# Patient Record
Sex: FEMALE | Race: WHITE | Employment: OTHER | URBAN - NONMETROPOLITAN AREA
[De-identification: names, ages, dates, MRNs, and addresses within clinical notes are randomized per-mention and may not be internally consistent; named-entity substitution may affect disease eponyms.]

---

## 2018-09-07 ENCOUNTER — HOSPITAL ENCOUNTER (OUTPATIENT)
Age: 68
Discharge: HOME | End: 2018-09-07
Payer: MEDICARE

## 2018-09-07 LAB
ALBUMIN SERPL-MCNC: 4.6 G/DL (ref 3.5–5.1)
ALP BLD-CCNC: 82 U/L (ref 38–126)
ALT SERPL-CCNC: 29 U/L (ref 11–66)
ANION GAP SERPL CALCULATED.3IONS-SCNC: 17 MEQ/L (ref 8–16)
AST SERPL-CCNC: 30 U/L (ref 5–40)
BASOPHILS # BLD: 0.5 %
BASOPHILS ABSOLUTE: 0 THOU/MM3 (ref 0–0.1)
BILIRUB SERPL-MCNC: 1.1 MG/DL (ref 0.3–1.2)
BILIRUBIN DIRECT: < 0.2 MG/DL (ref 0–0.3)
BUN BLDV-MCNC: 13 MG/DL (ref 7–22)
CALCIUM SERPL-MCNC: 10.8 MG/DL (ref 8.5–10.5)
CHLORIDE BLD-SCNC: 98 MEQ/L (ref 98–111)
CHOLESTEROL, TOTAL: 195 MG/DL (ref 100–199)
CO2: 26 MEQ/L (ref 23–33)
CREAT SERPL-MCNC: 0.7 MG/DL (ref 0.4–1.2)
EOSINOPHIL # BLD: 1.4 %
EOSINOPHILS ABSOLUTE: 0.1 THOU/MM3 (ref 0–0.4)
ERYTHROCYTE [DISTWIDTH] IN BLOOD BY AUTOMATED COUNT: 13.2 % (ref 11.5–14.5)
ERYTHROCYTE [DISTWIDTH] IN BLOOD BY AUTOMATED COUNT: 39.6 FL (ref 35–45)
GFR SERPL CREATININE-BSD FRML MDRD: 83 ML/MIN/1.73M2
GLUCOSE BLD-MCNC: 96 MG/DL (ref 70–108)
HCT VFR BLD CALC: 43.4 % (ref 37–47)
HDLC SERPL-MCNC: 53 MG/DL
HEMOGLOBIN: 15.1 GM/DL (ref 12–16)
IMMATURE GRANS (ABS): 0.01 THOU/MM3 (ref 0–0.07)
IMMATURE GRANULOCYTES: 0.2 %
LDL CHOLESTEROL CALCULATED: 111 MG/DL
LYMPHOCYTES # BLD: 27.2 %
LYMPHOCYTES ABSOLUTE: 1.7 THOU/MM3 (ref 1–4.8)
MCH RBC QN AUTO: 29.1 PG (ref 26–33)
MCHC RBC AUTO-ENTMCNC: 34.8 GM/DL (ref 32.2–35.5)
MCV RBC AUTO: 83.6 FL (ref 81–99)
MONOCYTES # BLD: 5.7 %
MONOCYTES ABSOLUTE: 0.4 THOU/MM3 (ref 0.4–1.3)
NUCLEATED RED BLOOD CELLS: 0 /100 WBC
PLATELET # BLD: 264 THOU/MM3 (ref 130–400)
PMV BLD AUTO: 10.4 FL (ref 9.4–12.4)
POTASSIUM SERPL-SCNC: 3.9 MEQ/L (ref 3.5–5.2)
RBC # BLD: 5.19 MILL/MM3 (ref 4.2–5.4)
SEG NEUTROPHILS: 65 %
SEGMENTED NEUTROPHILS ABSOLUTE COUNT: 4.2 THOU/MM3 (ref 1.8–7.7)
SODIUM BLD-SCNC: 141 MEQ/L (ref 135–145)
TOTAL PROTEIN: 8.1 G/DL (ref 6.1–8)
TRIGL SERPL-MCNC: 155 MG/DL (ref 0–199)
WBC # BLD: 6.4 THOU/MM3 (ref 4.8–10.8)

## 2018-09-07 PROCEDURE — 80053 COMPREHEN METABOLIC PANEL: CPT

## 2018-09-07 PROCEDURE — 36415 COLL VENOUS BLD VENIPUNCTURE: CPT

## 2018-09-07 PROCEDURE — 80061 LIPID PANEL: CPT

## 2018-09-07 PROCEDURE — 82248 BILIRUBIN DIRECT: CPT

## 2018-09-07 PROCEDURE — 85025 COMPLETE CBC W/AUTO DIFF WBC: CPT

## 2020-02-03 ENCOUNTER — HOSPITAL ENCOUNTER (OUTPATIENT)
Dept: INFUSION THERAPY | Age: 70
Discharge: HOME | End: 2020-02-03
Payer: MEDICARE

## 2020-02-03 ENCOUNTER — OFFICE VISIT (OUTPATIENT)
Dept: ONCOLOGY | Age: 70
End: 2020-02-03
Payer: MEDICARE

## 2020-02-03 VITALS
BODY MASS INDEX: 27 KG/M2 | OXYGEN SATURATION: 96 % | TEMPERATURE: 98.8 F | RESPIRATION RATE: 18 BRPM | HEART RATE: 93 BPM | DIASTOLIC BLOOD PRESSURE: 84 MMHG | HEIGHT: 63 IN | SYSTOLIC BLOOD PRESSURE: 153 MMHG | WEIGHT: 152.4 LBS

## 2020-02-03 PROCEDURE — 99205 OFFICE O/P NEW HI 60 MIN: CPT | Performed by: INTERNAL MEDICINE

## 2020-02-03 PROCEDURE — 99211 OFF/OP EST MAY X REQ PHY/QHP: CPT

## 2020-02-03 RX ORDER — ANASTROZOLE 1 MG/1
1 TABLET ORAL DAILY
Qty: 30 TABLET | Refills: 3 | Status: SHIPPED | OUTPATIENT
Start: 2020-02-03 | End: 2020-03-17 | Stop reason: SDUPTHER

## 2020-02-19 PROBLEM — C50.612 MALIGNANT NEOPLASM OF AXILLARY TAIL OF LEFT BREAST IN FEMALE, ESTROGEN RECEPTOR POSITIVE (HCC): Status: ACTIVE | Noted: 2020-02-19

## 2020-02-19 PROBLEM — Z17.0 MALIGNANT NEOPLASM OF AXILLARY TAIL OF LEFT BREAST IN FEMALE, ESTROGEN RECEPTOR POSITIVE (HCC): Status: ACTIVE | Noted: 2020-02-19

## 2020-02-19 PROBLEM — Z51.81 ENCOUNTER FOR MONITORING AROMATASE INHIBITOR THERAPY: Status: ACTIVE | Noted: 2020-02-19

## 2020-02-19 PROBLEM — Z79.811 ENCOUNTER FOR MONITORING AROMATASE INHIBITOR THERAPY: Status: ACTIVE | Noted: 2020-02-19

## 2020-02-19 NOTE — PROGRESS NOTES
measuring 4 cm with negative surgical margins. 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 A total of 5 sentinel lymph nodes were noted to be negative for malignancy. In context to this condition, the patient's estrogen receptor was noted to be positive at 100%, progesterone receptors were positive at 85%, and HER-2/agusto overexpression was negative. Ki-67 was low at 5%. Although I do not have the reports the patient states that she did have genetic testing completed as she has of [address] descent. The patient reports to me that the BRCA gene analysis was negative. Another modifying factor affecting this condition is that Oncotype DX testing was completed. The patient's recurrence score was noted to be 9. Therefore she did not receive a recommendation of chemotherapy. However the patient did receive a recommendation of radiation therapy and this was recently completed at [facility].  She is here today for follow-up and a second opinion discussion regarding further adjuvant therapy. The patient would be a candidate for aromatase inhibitor therapy. However of note, the patient did take a prolonged course of oral estrogen during menopausal at extending into her perimenopausal state. This was given to her due to severe symptoms of hot flashes, night sweats, and irritability related to menopause. I did review with the patient that aromatase center therapy may possibly also cause the symptoms. She has not taken any oral estrogen therapy since her diagnosis of malignancy and has had only minimal symptoms of hot flashes and night sweats. After a thorough discussion she is willing to seed with a trial of therapy with aromatase inhibitors. Past Medical History  She has a past medical history of Hyperlipidemia and Hypertension. Surgical History  She has a past surgical history that includes Endometrial ablation; Tonsillectomy; Dilation and curettage of uterus;  Finger trigger release; and Breast lumpectomy (11/05/2019). Home Medications  She has a current medication list which includes the following prescription(s): anastrozole, simvastatin, triamterene-hydrochlorothiazide, cetirizine, vitamin d, b complex vitamins, polycarbophil, docusate sodium, and estradiol. Allergies  Allergies   Allergen Reactions    Bee Venom Other (See Comments)     Hanging skin    Sulfa Antibiotics      Social History  She reports that she quit smoking about 39 years ago. She has never used smokeless tobacco. She reports that she does not drink alcohol or use drugs. Family History  Her family history includes Heart Disease in her father and mother; Thyroid Disease in her sister. Review of Systems  Constitutional: Fatigue, night sweats. HENT: Negative. Eyes: Negative. Respiratory: Negative. Cardiovascular: Negative. Gastrointestinal: Negative. Genitourinary: Negative. Musculoskeletal: Negative. Skin: Negative. Neurological: Negative. Hematological: Negative. Psychiatric/Behavioral: Negative. Objective:   Physical Exam  Vitals:    02/03/20 1149   BP: (!) 153/84   Pulse: 93   Resp: 18   Temp: 98.8 °F (37.1 °C)   SpO2: 96%   Vitals reviewed and are stable. Constitutional: Well-developed and well-nourished. No acute distress. HENT: Normocephalic and atraumatic. Eyes: Pupils are equal and reactive. No scleral icterus. Neck: Overall appearance is symmetrical. No identifiable masses. Chest: Mild erythema located within the area of the radiation port to the right breast.  Pulmonary: Effort normal. No respiratory distress. Cardiovascular: RRR. No edema in any of the four extremities. Abdominal: Soft. No hepatomegaly or splenomegaly. Musculoskeletal: Gait is normal. Muscle strength and tone good. Neurological: Alert and oriented to person, place, and time. Judgment and thought content normal.  Psychiatric: Mood and affect appropriate for the clinical situation.  Behavior which are inherent in voice recognition technology. **

## 2020-03-16 ENCOUNTER — HOSPITAL ENCOUNTER (OUTPATIENT)
Dept: INFUSION THERAPY | Age: 70
Discharge: HOME | End: 2020-03-16
Payer: MEDICARE

## 2020-03-16 ENCOUNTER — OFFICE VISIT (OUTPATIENT)
Dept: ONCOLOGY | Age: 70
End: 2020-03-16
Payer: MEDICARE

## 2020-03-16 VITALS
BODY MASS INDEX: 26.9 KG/M2 | RESPIRATION RATE: 18 BRPM | HEIGHT: 63 IN | HEART RATE: 80 BPM | SYSTOLIC BLOOD PRESSURE: 144 MMHG | WEIGHT: 151.8 LBS | OXYGEN SATURATION: 99 % | TEMPERATURE: 99.7 F | DIASTOLIC BLOOD PRESSURE: 75 MMHG

## 2020-03-16 DIAGNOSIS — C50.612 MALIGNANT NEOPLASM OF AXILLARY TAIL OF LEFT BREAST IN FEMALE, ESTROGEN RECEPTOR POSITIVE (HCC): ICD-10-CM

## 2020-03-16 DIAGNOSIS — Z17.0 MALIGNANT NEOPLASM OF AXILLARY TAIL OF LEFT BREAST IN FEMALE, ESTROGEN RECEPTOR POSITIVE (HCC): ICD-10-CM

## 2020-03-16 LAB
BASOPHILS # BLD: 0.6 %
BASOPHILS ABSOLUTE: 0 THOU/MM3 (ref 0–0.1)
CHLORIDE, WHOLE BLOOD: 98 MEQ/L (ref 98–109)
CREATININE, WHOLE BLOOD: 0.8 MG/DL (ref 0.5–1.2)
EOSINOPHIL # BLD: 3.4 %
EOSINOPHILS ABSOLUTE: 0.2 THOU/MM3 (ref 0–0.4)
GFR, ESTIMATED ,CON: 75 ML/MIN/1.73M2
GLUCOSE, WHOLE BLOOD: 86 MG/DL (ref 70–108)
HCT VFR BLD CALC: 43 % (ref 37–47)
HEMOGLOBIN: 14.6 GM/DL (ref 12–16)
IMMATURE GRANS (ABS): 0.02 THOU/MM3 (ref 0–0.07)
IMMATURE GRANULOCYTES: 0.3 %
IONIZED CALCIUM, WHOLE BLOOD: 1.26 MMOL/L (ref 1.12–1.32)
LYMPHOCYTES # BLD: 22.1 %
LYMPHOCYTES ABSOLUTE: 1.3 THOU/MM3 (ref 1–4.8)
MCH RBC QN AUTO: 29 PG (ref 27–31)
MCHC RBC AUTO-ENTMCNC: 34 GM/DL (ref 33–37)
MCV RBC AUTO: 85 FL (ref 81–99)
MONOCYTES # BLD: 9.2 %
MONOCYTES ABSOLUTE: 0.6 THOU/MM3 (ref 0.4–1.3)
NUCLEATED RED BLOOD CELLS: 0 /100 WBC
PDW BLD-RTO: 13.6 % (ref 11.5–14.5)
PLATELET # BLD: 269 THOU/MM3 (ref 130–400)
PMV BLD AUTO: 7.8 FL (ref 7.4–10.4)
POTASSIUM, WHOLE BLOOD: 3.7 MEQ/L (ref 3.5–4.9)
RBC # BLD: 5.03 MILL/MM3 (ref 4.2–5.4)
SEG NEUTROPHILS: 64.4 %
SEGMENTED NEUTROPHILS ABSOLUTE COUNT: 3.9 THOU/MM3 (ref 1.8–7.7)
SODIUM, WHOLE BLOOD: 140 MEQ/L (ref 138–146)
WBC # BLD: 6 THOU/MM3 (ref 4.8–10.8)

## 2020-03-16 PROCEDURE — 36415 COLL VENOUS BLD VENIPUNCTURE: CPT

## 2020-03-16 PROCEDURE — 84520 ASSAY OF UREA NITROGEN: CPT

## 2020-03-16 PROCEDURE — 82374 ASSAY BLOOD CARBON DIOXIDE: CPT

## 2020-03-16 PROCEDURE — 99214 OFFICE O/P EST MOD 30 MIN: CPT | Performed by: INTERNAL MEDICINE

## 2020-03-16 PROCEDURE — 99211 OFF/OP EST MAY X REQ PHY/QHP: CPT

## 2020-03-16 PROCEDURE — 82310 ASSAY OF CALCIUM: CPT

## 2020-03-16 PROCEDURE — 85025 COMPLETE CBC W/AUTO DIFF WBC: CPT

## 2020-03-16 PROCEDURE — 80047 BASIC METABLC PNL IONIZED CA: CPT

## 2020-03-16 PROCEDURE — 83735 ASSAY OF MAGNESIUM: CPT

## 2020-03-16 PROCEDURE — 80076 HEPATIC FUNCTION PANEL: CPT

## 2020-03-16 NOTE — PROGRESS NOTES
[facility] CANCER CENTER  CANCER NETWORK OF [facility]  [facility] [address] [facility]  [address], [facility] [address]  [address]  Dept: [phone]  Dept Fax: [phone]  Loc: [phone]     Subjective:      Chief Complaint:  Sol Gonzáles is a 79 y.o. with breast cancer. She initially presented in September 2019 for a routine screening mammogram.  She was noted to have an architectural distortion located in the superior outer aspect of the right breast.  At the time of her mammogram the patient had no associated signs or symptoms of breast disease. She denied feeling any lumps in her breast, having tender breast or any type of discharge from her nipple. The patient was in good health. This led to a ultrasound being completed which confirmed a 1.2 cm abnormality treated in the superior aspect of the breast right breast.  There was a second hypoechoic lobulated mass located beside this mass that measured 2.0 cm in size. In context of this condition an ultrasound-guided right breast biopsy was completed on October 11, 2019. Surgical pathology confirmed an intraductal papilloma with associated microcalcifications as well as ductal cell hyperplasia with columnar cell changes. A second biopsy was completed on October 15, 2019. 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 There is also a atypical ductal hyperplasia. A modifying factor affecting this condition is that on November 5, 2019 the patient underwent a right lumpectomy with sentinel lymph node biopsy by Dr. Jim Maciel at [facility].  Surgical pathology confirmed an invasive papillary carcinoma, grade 1, nuclear grade 1, measuring 4 cm with negative surgical margins. 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 A total of 5 sentinel lymph nodes were noted to be negative for malignancy.   In context to this condition, the patient's estrogen receptor was noted to be positive at 100%, progesterone receptors were positive at 85%, and HER-2/agusto overexpression was negative. Ki-67 was low at 5%. Although I do not have the reports the patient states that she did have genetic testing completed as she has of [address] descent. The patient reports to me that the BRCA gene analysis was negative. Another modifying factor affecting this condition is that Oncotype DX testing was completed. The patient's recurrence score was noted to be 9. Therefore she did not receive a recommendation of chemotherapy. However the patient did receive a recommendation of radiation therapy and this was recently completed at [facility].     HPI:    Pankaj Mathis is here today for follow-up regarding her history of breast cancer. Since being seen here last the patient is started on therapy with an aromatase inhibitor. She currently is taking Arimidex 1 mg tablet by mouth daily. Thus far the patient has tolerated fairly well. She has had some night sweats with some difficulty sleeping but states that those are beginning to improve. At this time, the patient states that the toxicity related to the aromatase inhibitor has been tolerable. She does not have any signs or symptoms of be suggestive recurrence of her breast cancer. Her general health has been stable. The patient denies shortness of breath, chest pain, a change in bowel habits or a change in bladder habits. ECOG performance status is level 0. The patient's blood pressure is modestly elevated. She will continue to monitor blood pressure and follow-up with her primary care provider for further management. PMH, SH, and FH:  I reviewed the patients medication list and allergy list as noted on the electronic medical record. The PMH, SH and FH were also reviewed as noted on the EMR. Review of Systems  Constitutional: Night sweats. HENT: Negative. Eyes: Negative. Monitor blood pressure and follow up with primary care provider for further surveillance and management. Jayden Garcia M.D. Medical Director: [facility]  Cancer [place] [address], 1 [facility], [address] [place] [address], [address] [place] [address], [address] [place] [address] of the [facility] at the [facility]      **This report has been created using voice recognition software. It may contain minor errors which are inherent in voice recognition technology. **

## 2020-03-17 LAB
ALBUMIN SERPL-MCNC: 4.5 G/DL (ref 3.5–5.1)
ALP BLD-CCNC: 100 U/L (ref 38–126)
ALT SERPL-CCNC: 26 U/L (ref 11–66)
AST SERPL-CCNC: 30 U/L (ref 5–40)
BILIRUB SERPL-MCNC: 0.9 MG/DL (ref 0.3–1.2)
BILIRUBIN DIRECT: < 0.2 MG/DL (ref 0–0.3)
BUN BLDV-MCNC: 12 MG/DL (ref 7–22)
CALCIUM SERPL-MCNC: 10.8 MG/DL (ref 8.5–10.5)
CO2: 27 MEQ/L (ref 23–33)
MAGNESIUM: 2.3 MG/DL (ref 1.6–2.4)
TOTAL PROTEIN: 7.8 G/DL (ref 6.1–8)

## 2020-03-17 RX ORDER — ANASTROZOLE 1 MG/1
1 TABLET ORAL DAILY
Qty: 90 TABLET | Refills: 3 | Status: SHIPPED | OUTPATIENT
Start: 2020-03-17 | End: 2021-02-05

## 2020-03-26 PROBLEM — I10 HYPERTENSION: Status: ACTIVE | Noted: 2020-03-26

## 2020-05-20 ENCOUNTER — TELEPHONE (OUTPATIENT)
Dept: ONCOLOGY | Age: 70
End: 2020-05-20

## 2020-06-02 ENCOUNTER — TELEPHONE (OUTPATIENT)
Dept: ONCOLOGY | Age: 70
End: 2020-06-02

## 2020-06-02 NOTE — TELEPHONE ENCOUNTER
Patient called and has not been able sleep for awhile she has been taking melatonin and that is not helping. She said it is from her Arimidex.     Please advise

## 2020-06-03 RX ORDER — TEMAZEPAM 15 MG/1
CAPSULE ORAL
Qty: 60 CAPSULE | Refills: 3 | Status: SHIPPED | OUTPATIENT
Start: 2020-06-03 | End: 2020-07-03

## 2020-09-09 ENCOUNTER — OFFICE VISIT (OUTPATIENT)
Dept: ONCOLOGY | Age: 70
End: 2020-09-09
Payer: MEDICARE

## 2020-09-09 ENCOUNTER — HOSPITAL ENCOUNTER (OUTPATIENT)
Dept: INFUSION THERAPY | Age: 70
Discharge: HOME | End: 2020-09-09
Payer: MEDICARE

## 2020-09-09 VITALS
OXYGEN SATURATION: 98 % | HEIGHT: 63 IN | WEIGHT: 149.4 LBS | DIASTOLIC BLOOD PRESSURE: 72 MMHG | TEMPERATURE: 99.3 F | BODY MASS INDEX: 26.47 KG/M2 | RESPIRATION RATE: 16 BRPM | SYSTOLIC BLOOD PRESSURE: 161 MMHG | HEART RATE: 93 BPM

## 2020-09-09 DIAGNOSIS — C50.612 MALIGNANT NEOPLASM OF AXILLARY TAIL OF LEFT BREAST IN FEMALE, ESTROGEN RECEPTOR POSITIVE (HCC): ICD-10-CM

## 2020-09-09 DIAGNOSIS — Z17.0 MALIGNANT NEOPLASM OF AXILLARY TAIL OF LEFT BREAST IN FEMALE, ESTROGEN RECEPTOR POSITIVE (HCC): ICD-10-CM

## 2020-09-09 LAB
ABSOLUTE IMMATURE GRANULOCYTE: 0.01 THOU/MM3 (ref 0–0.07)
BASINOPHIL, AUTOMATED: 0 % (ref 0–3)
BASOPHILS ABSOLUTE: 0 THOU/MM3 (ref 0–0.1)
CHLORIDE, WHOLE BLOOD: 101 MEQ/L (ref 98–109)
CREATININE, WHOLE BLOOD: 0.7 MG/DL (ref 0.5–1.2)
EOSINOPHILS ABSOLUTE: 0.2 THOU/MM3 (ref 0–0.4)
EOSINOPHILS RELATIVE PERCENT: 3 % (ref 0–4)
GFR, ESTIMATED ,CON: 88 ML/MIN/1.73M2
GLUCOSE, WHOLE BLOOD: 114 MG/DL (ref 70–108)
HCT VFR BLD CALC: 40.6 % (ref 37–47)
HEMOGLOBIN: 13.7 GM/DL (ref 12–16)
IMMATURE GRANULOCYTES: 0 %
IONIZED CALCIUM, WHOLE BLOOD: 1.24 MMOL/L (ref 1.12–1.32)
LYMPHOCYTES # BLD: 24 % (ref 15–47)
LYMPHOCYTES ABSOLUTE: 1.5 THOU/MM3 (ref 1–4.8)
MCH RBC QN AUTO: 28.8 PG (ref 26–33)
MCHC RBC AUTO-ENTMCNC: 33.7 GM/DL (ref 32.2–35.5)
MCV RBC AUTO: 85 FL (ref 81–99)
MONOCYTES ABSOLUTE: 0.4 THOU/MM3 (ref 0.4–1.3)
MONOCYTES: 7 % (ref 0–12)
PDW BLD-RTO: 13.1 % (ref 11.5–14.5)
PLATELET # BLD: 256 THOU/MM3 (ref 130–400)
PMV BLD AUTO: 9.7 FL (ref 9.4–12.4)
POTASSIUM, WHOLE BLOOD: 3.3 MEQ/L (ref 3.5–4.9)
RBC # BLD: 4.76 MILL/MM3 (ref 4.2–5.4)
SEG NEUTROPHILS: 66 % (ref 43–75)
SEGMENTED NEUTROPHILS ABSOLUTE COUNT: 4 THOU/MM3 (ref 1.8–7.7)
SODIUM, WHOLE BLOOD: 140 MEQ/L (ref 138–146)
WBC # BLD: 6.1 THOU/MM3 (ref 4.8–10.8)

## 2020-09-09 PROCEDURE — 84520 ASSAY OF UREA NITROGEN: CPT

## 2020-09-09 PROCEDURE — 85025 COMPLETE CBC W/AUTO DIFF WBC: CPT

## 2020-09-09 PROCEDURE — 80076 HEPATIC FUNCTION PANEL: CPT

## 2020-09-09 PROCEDURE — 82374 ASSAY BLOOD CARBON DIOXIDE: CPT

## 2020-09-09 PROCEDURE — 80047 BASIC METABLC PNL IONIZED CA: CPT

## 2020-09-09 PROCEDURE — 36415 COLL VENOUS BLD VENIPUNCTURE: CPT

## 2020-09-09 PROCEDURE — 99211 OFF/OP EST MAY X REQ PHY/QHP: CPT

## 2020-09-09 PROCEDURE — 99214 OFFICE O/P EST MOD 30 MIN: CPT | Performed by: INTERNAL MEDICINE

## 2020-09-09 RX ORDER — CHOLECALCIFEROL (VITAMIN D3) 125 MCG
5 CAPSULE ORAL DAILY
COMMUNITY

## 2020-09-09 RX ORDER — ACETAMINOPHEN/DIPHENHYDRAMINE 500MG-25MG
1 TABLET ORAL NIGHTLY PRN
COMMUNITY

## 2020-09-10 LAB
ALBUMIN SERPL-MCNC: 4.1 G/DL (ref 3.5–5.1)
ALP BLD-CCNC: 97 U/L (ref 38–126)
ALT SERPL-CCNC: 28 U/L (ref 11–66)
AST SERPL-CCNC: 31 U/L (ref 5–40)
BILIRUB SERPL-MCNC: 0.5 MG/DL (ref 0.3–1.2)
BILIRUBIN DIRECT: < 0.2 MG/DL (ref 0–0.3)
BUN BLDV-MCNC: 15 MG/DL (ref 7–22)
CO2: 29 MEQ/L (ref 23–33)
TOTAL PROTEIN: 7.6 G/DL (ref 6.1–8)

## 2020-09-17 NOTE — PROGRESS NOTES
[facility] CANCER CENTER  CANCER NETWORK OF [facility]  [facility] [address] [facility] [address]  [address]  [address]  Dept: [phone]  Dept Fax: [phone]  Loc: [phone]     Subjective:      Chief Complaint:  Klaus Monroy is a 79 y.o. with breast cancer. She initially presented in September 2019 for a routine screening mammogram.  She was noted to have an architectural distortion located in the superior outer aspect of the right breast.  At the time of her mammogram the patient had no associated signs or symptoms of breast disease. She denied feeling any lumps in her breast, having tender breast or any type of discharge from her nipple. The patient was in good health. This led to a ultrasound being completed which confirmed a 1.2 cm abnormality treated in the superior aspect of the breast right breast.  There was a second hypoechoic lobulated mass located beside this mass that measured 2.0 cm in size. In context of this condition an ultrasound-guided right breast biopsy was completed on October 11, 2019. Surgical pathology confirmed an intraductal papilloma with associated microcalcifications as well as ductal cell hyperplasia with columnar cell changes. A second biopsy was completed on October 15, 2019. 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 There is also a atypical ductal hyperplasia. A modifying factor affecting this condition is that on November 5, 2019 the patient underwent a right lumpectomy with sentinel lymph node biopsy by Dr. Domingo Mart at [facility].  Surgical pathology confirmed an invasive papillary carcinoma, grade 1, nuclear grade 1, measuring 4 cm with negative surgical margins. 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 A total of 5 sentinel lymph nodes were noted to be negative for malignancy.   In context to this condition, the patient's estrogen receptor was noted to be positive at 100%, progesterone receptors were positive at 85%, and HER-2/agusto overexpression was negative. Ki-67 was low at 5%. Although I do not have the reports the patient states that she did have genetic testing completed as she has of [address] descent. The patient reports to me that the BRCA gene analysis was negative. Another modifying factor affecting this condition is that Oncotype DX testing was completed. The patient's recurrence score was noted to be 9. Therefore she did not receive a recommendation of chemotherapy. However the patient did receive a recommendation of radiation therapy and this was recently completed at [facility].     HPI:     Chance Souza is here today for follow-up regarding her history of breast cancer. She continues on therapy with Arimidex. The patient's chief complaint related to Arimidex therapy is hot flashes and night sweats. This causes her to have significant insomnia and difficulty sleeping. She was given a prescription for Restoril which she did not tolerate well. The patient currently is using melatonin and Tylenol PM for her insomnia and states that this is working rather well for her at this time. The patient has no other specific symptoms related to Arimidex therapy. She also has no signs or symptoms that be suggestive recurrence of her malignancy. The patient denies skeletal pain. She has not had fever, cough, shortness of breath or other signs of infection. The patient's bowel and bladder habits have been normal.  She has not seen blood in her stool or urine. The patient remains active with an ECOG performance status of level 0. PMH, SH, and FH:  I reviewed the patients medication list and allergy list as noted on the electronic medical record. The PMH, SH and FH were also reviewed as noted on the EMR. Review of Systems  Constitutional: Hot flashes, night sweats, insomnia. HENT: Negative. Eyes: Negative. Respiratory: Negative. Cardiovascular: Negative. Gastrointestinal: Negative. Genitourinary: Negative. Musculoskeletal: Negative. Skin: Negative. Neurological: Negative. Hematological: Negative. Psychiatric/Behavioral: Negative. Objective:   Physical Exam  Vitals:    09/09/20 1353   BP: (!) 161/72   Site: Left Upper Arm   Position: Sitting   Cuff Size: Medium Adult   Pulse: 93   Resp: 16   Temp: 99.3 °F (37.4 °C)   TempSrc: Oral   SpO2: 98%   Weight: 149 lb 6.4 oz (67.8 kg)   Height: 5' 2.52\" (1.588 m)   Vitals reviewed and are stable. Constitutional: Elderly, frail. No acute distress. HENT: Normocephalic and atraumatic. Oral mucosa clear. Eyes: Pupils are equal and reactive. No scleral icterus. Neck: Bilateral appearance is symmetrical. No identifiable masses. Chest: Inspection and palpation of chest is normal.  Pulmonary: Effort normal. No respiratory distress. No rhonchi, rales or wheezing. Cardiovascular: Regular rate and rhythm normal S1 and S2. Abdominal: Soft. Bowel sounds present. No hepatomegaly or splenomegaly. Musculoskeletal: Gait is abnormal. Muscle strength and tone decreased in all four extremities. Neurological: Alert and oriented to person, place, and time. Judgment and thought content normal.  Skin: Scattered ecchymosis noted on bilateral upper forearms. Psychiatric: Mood and affect appropriate for the clinical situation. Behavior is normal.     Data Analysis: The following studies were reviewed with the patient today:    Hematology 9/9/2020 3/16/2020 9/7/2018   WBC 6.1 6.0 6.4   RBC 4.76 5.03 5.19   HGB 13.7 14.6 15.1   HCT 40.6 43.0 43.4   MCV 85 85 83.6   RDW 13.1 13.6     269 264     Assessment:   1. 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  2.  Therapy with aromatase number-REM attacks. 3.  Hypertension. 4.  Insomnia. Plan:   1. Continue a Arimidex 1 mg tablet by mouth daily.   2. Monitor for recurrence of malignancy. 3.  Monitor for side effects and toxicity related to Arimidex. 4.  Monitor blood pressure and follow up with primary care provider for further surveillance and management. 5.  Continue present use of over-the-counter medications with melatonin and Tylenol PM for insomnia. Clive Stephen M.D. Medical Director: [facility]  Cancer Network [place] [address], [address] [place] [address], [address] [place] [address], [address] [place] [address], [address] [place] [address] of the [facility] at [facility]      **This report has been created using voice recognition software. It may contain minor errors which are inherent in voice recognition technology. ** Applied

## 2021-02-05 RX ORDER — ANASTROZOLE 1 MG/1
TABLET ORAL
Qty: 90 TABLET | Refills: 3 | Status: SHIPPED | OUTPATIENT
Start: 2021-02-05 | End: 2021-03-09 | Stop reason: SDUPTHER

## 2021-03-08 NOTE — PROGRESS NOTES
[facility] CANCER CENTER  CANCER NETWORK OF [facility]  [facility] [address] [address]  [address] [facility] [address]  Dept: [phone]  Dept Fax: [phone]  Loc: [phone]     Subjective:      Chief Complaint:  Niko Dominguez is a 70 y.o. with breast cancer. She initially presented in September 2019 for a routine screening mammogram.  She was noted to have an architectural distortion located in the superior outer aspect of the right breast.  At the time of her mammogram the patient had no associated signs or symptoms of breast disease. She denied feeling any lumps in her breast, having tender breast or any type of discharge from her nipple. The patient was in good health. This led to a ultrasound being completed which confirmed a 1.2 cm abnormality treated in the superior aspect of the breast right breast.  There was a second hypoechoic lobulated mass located beside this mass that measured 2.0 cm in size. In context of this condition an ultrasound-guided right breast biopsy was completed on October 11, 2019. Surgical pathology confirmed an intraductal papilloma with associated microcalcifications as well as ductal cell hyperplasia with columnar cell changes. A second biopsy was completed on October 15, 2019. 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 There is also a atypical ductal hyperplasia. A modifying factor affecting this condition is that on November 5, 2019 the patient underwent a right lumpectomy with sentinel lymph node biopsy by Dr. Janeen Bishop at [facility].  Surgical pathology confirmed an invasive papillary carcinoma, grade 1, nuclear grade 1, measuring 4 cm with negative surgical margins. 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 A total of 5 sentinel lymph nodes were noted to be negative for malignancy.   In context to this condition, the patient's estrogen receptor was noted to be positive at 100%, progesterone receptors were positive at 85%, and HER-2/agusto overexpression was negative. Ki-67 was low at 5%. Although I do not have the reports the patient states that she did have genetic testing completed as she has of [address] descent. The patient reports to me that the BRCA gene analysis was negative. Another modifying factor affecting this condition is that Oncotype DX testing was completed. The patient's recurrence score was noted to be 9. Therefore she did not receive a recommendation of chemotherapy. However the patient did receive a recommendation of radiation therapy and this was recently completed at [facility].     HPI:      The patient is here today for follow examination. She is here for follow up of her history of breast cancer. The patient is currently on therapy with Arimidex. She tolerates this well and without side effects. Her overall health has been good. She has no signs or symptoms that are suggestive of recurrence of her breast cancer. The patient denies skeletal pain. She has not had fever or other signs of infection. The patient denies shortness of breath, chest pain, a change in bowel habits or a change in bladder habits. ECOG performance status is level 0. Her most recent mammogram was on 02/24/2021. This was reported as a BI-RADS Category 3 mammogram and recommendation was to repeat her mammogram in 6 months. This will be scheduled in follow-up point as planned. The results of the mammogram reviewed with the patient today. She continues to have insomnia related to hot flashes and night sweats from antiestrogen therapy. The patient is using melatonin to help with insomnia. Her blood pressure remains modestly elevated. She will continue to monitor her blood pressure and follow-up with her primary care provider.   Laboratory work from today was reviewed with the patient and was melatonin and Tylenol PM for insomnia. Marina Whitaker M.D. Medical Director: [facility]  Cancer [place] [address], [address] [place] [address], [address] [place] [address], [address] [place] [address], [address] [place] [address] of the [facility] at the [facility]      **This report has been created using voice recognition software. It may contain minor errors which are inherent in voice recognition technology. **

## 2021-03-09 ENCOUNTER — HOSPITAL ENCOUNTER (OUTPATIENT)
Dept: INFUSION THERAPY | Age: 71
Discharge: HOME | End: 2021-03-09
Payer: MEDICARE

## 2021-03-09 ENCOUNTER — OFFICE VISIT (OUTPATIENT)
Dept: ONCOLOGY | Age: 71
End: 2021-03-09
Payer: MEDICARE

## 2021-03-09 ENCOUNTER — TELEPHONE (OUTPATIENT)
Dept: ONCOLOGY | Age: 71
End: 2021-03-09

## 2021-03-09 VITALS
BODY MASS INDEX: 25.83 KG/M2 | DIASTOLIC BLOOD PRESSURE: 76 MMHG | HEART RATE: 72 BPM | OXYGEN SATURATION: 98 % | TEMPERATURE: 98.8 F | SYSTOLIC BLOOD PRESSURE: 150 MMHG | HEIGHT: 63 IN | WEIGHT: 145.8 LBS | RESPIRATION RATE: 18 BRPM

## 2021-03-09 DIAGNOSIS — Z79.811 ENCOUNTER FOR MONITORING AROMATASE INHIBITOR THERAPY: Primary | ICD-10-CM

## 2021-03-09 DIAGNOSIS — Z51.81 ENCOUNTER FOR MONITORING AROMATASE INHIBITOR THERAPY: Primary | ICD-10-CM

## 2021-03-09 DIAGNOSIS — C50.811 MALIGNANT NEOPLASM OF OVERLAPPING SITES OF RIGHT BREAST IN FEMALE, ESTROGEN RECEPTOR POSITIVE (HCC): ICD-10-CM

## 2021-03-09 DIAGNOSIS — Z17.0 MALIGNANT NEOPLASM OF OVERLAPPING SITES OF RIGHT BREAST IN FEMALE, ESTROGEN RECEPTOR POSITIVE (HCC): ICD-10-CM

## 2021-03-09 DIAGNOSIS — I10 HYPERTENSION, UNSPECIFIED TYPE: ICD-10-CM

## 2021-03-09 DIAGNOSIS — C50.612 MALIGNANT NEOPLASM OF AXILLARY TAIL OF LEFT BREAST IN FEMALE, ESTROGEN RECEPTOR POSITIVE (HCC): ICD-10-CM

## 2021-03-09 DIAGNOSIS — G47.00 INSOMNIA, UNSPECIFIED TYPE: ICD-10-CM

## 2021-03-09 DIAGNOSIS — Z17.0 MALIGNANT NEOPLASM OF AXILLARY TAIL OF LEFT BREAST IN FEMALE, ESTROGEN RECEPTOR POSITIVE (HCC): ICD-10-CM

## 2021-03-09 LAB
ABSOLUTE IMMATURE GRANULOCYTE: 0.01 THOU/MM3 (ref 0–0.07)
BASINOPHIL, AUTOMATED: 0 % (ref 0–3)
BASOPHILS ABSOLUTE: 0 THOU/MM3 (ref 0–0.1)
BUN, WHOLE BLOOD: 14 MG/DL (ref 8–26)
CHLORIDE, WHOLE BLOOD: 100 MEQ/L (ref 98–109)
CREATININE, WHOLE BLOOD: 0.8 MG/DL (ref 0.5–1.2)
EOSINOPHILS ABSOLUTE: 0.2 THOU/MM3 (ref 0–0.4)
EOSINOPHILS RELATIVE PERCENT: 3 % (ref 0–4)
GFR, ESTIMATED ,CON: 75 ML/MIN/1.73M2
GLUCOSE, WHOLE BLOOD: 99 MG/DL (ref 70–108)
HCT VFR BLD CALC: 40.4 % (ref 37–47)
HEMOGLOBIN: 13.6 GM/DL (ref 12–16)
IMMATURE GRANULOCYTES: 0 %
IONIZED CALCIUM, WHOLE BLOOD: 1.22 MMOL/L (ref 1.12–1.32)
LYMPHOCYTES # BLD: 25 % (ref 15–47)
LYMPHOCYTES ABSOLUTE: 1.9 THOU/MM3 (ref 1–4.8)
MCH RBC QN AUTO: 29.1 PG (ref 26–33)
MCHC RBC AUTO-ENTMCNC: 33.7 GM/DL (ref 32.2–35.5)
MCV RBC AUTO: 87 FL (ref 81–99)
MONOCYTES ABSOLUTE: 0.5 THOU/MM3 (ref 0.4–1.3)
MONOCYTES: 7 % (ref 0–12)
PDW BLD-RTO: 13.1 % (ref 11.5–14.5)
PLATELET # BLD: 255 THOU/MM3 (ref 130–400)
PMV BLD AUTO: 9.5 FL (ref 9.4–12.4)
POTASSIUM, WHOLE BLOOD: 3.4 MEQ/L (ref 3.5–4.9)
RBC # BLD: 4.67 MILL/MM3 (ref 4.2–5.4)
SEG NEUTROPHILS: 65 % (ref 43–75)
SEGMENTED NEUTROPHILS ABSOLUTE COUNT: 5.1 THOU/MM3 (ref 1.8–7.7)
SODIUM, WHOLE BLOOD: 139 MEQ/L (ref 138–146)
TOTAL CO2, WHOLE BLOOD: 30 MEQ/L (ref 23–33)
WBC # BLD: 7.8 THOU/MM3 (ref 4.8–10.8)

## 2021-03-09 PROCEDURE — 80076 HEPATIC FUNCTION PANEL: CPT

## 2021-03-09 PROCEDURE — 85025 COMPLETE CBC W/AUTO DIFF WBC: CPT

## 2021-03-09 PROCEDURE — 80047 BASIC METABLC PNL IONIZED CA: CPT

## 2021-03-09 PROCEDURE — 99211 OFF/OP EST MAY X REQ PHY/QHP: CPT

## 2021-03-09 PROCEDURE — 99214 OFFICE O/P EST MOD 30 MIN: CPT | Performed by: INTERNAL MEDICINE

## 2021-03-09 PROCEDURE — 36415 COLL VENOUS BLD VENIPUNCTURE: CPT

## 2021-03-09 RX ORDER — ZOLEDRONIC ACID 5 MG/100ML
5 INJECTION, SOLUTION INTRAVENOUS
COMMUNITY
Start: 2020-09-17

## 2021-03-09 RX ORDER — CALCIUM CARBONATE 500(1250)
500 TABLET ORAL DAILY
COMMUNITY

## 2021-03-09 RX ORDER — ANASTROZOLE 1 MG/1
TABLET ORAL
Qty: 90 TABLET | Refills: 3 | Status: SHIPPED | OUTPATIENT
Start: 2021-03-09 | End: 2022-04-18

## 2021-03-09 RX ORDER — SODIUM PHOSPHATE,MONO-DIBASIC 19G-7G/118
1 ENEMA (ML) RECTAL 3 TIMES DAILY
COMMUNITY
Start: 2020-09-15

## 2021-03-09 NOTE — TELEPHONE ENCOUNTER
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 Patient thought she had cancer in the right breast?    Please advise

## 2021-03-10 PROBLEM — C50.811 MALIGNANT NEOPLASM OF OVERLAPPING SITES OF RIGHT BREAST IN FEMALE, ESTROGEN RECEPTOR POSITIVE (HCC): Status: ACTIVE | Noted: 2021-03-10

## 2021-03-10 PROBLEM — G47.00 INSOMNIA: Status: ACTIVE | Noted: 2021-03-10

## 2021-03-10 PROBLEM — Z17.0 MALIGNANT NEOPLASM OF OVERLAPPING SITES OF RIGHT BREAST IN FEMALE, ESTROGEN RECEPTOR POSITIVE (HCC): Status: ACTIVE | Noted: 2021-03-10

## 2021-03-10 LAB
ALBUMIN SERPL-MCNC: 4.5 G/DL (ref 3.5–5.1)
ALP BLD-CCNC: 92 U/L (ref 38–126)
ALT SERPL-CCNC: 24 U/L (ref 11–66)
AST SERPL-CCNC: 32 U/L (ref 5–40)
BILIRUB SERPL-MCNC: 0.7 MG/DL (ref 0.3–1.2)
BILIRUBIN DIRECT: < 0.2 MG/DL (ref 0–0.3)
TOTAL PROTEIN: 7.8 G/DL (ref 6.1–8)

## 2021-09-08 ENCOUNTER — OFFICE VISIT (OUTPATIENT)
Dept: ONCOLOGY | Age: 71
End: 2021-09-08
Payer: MEDICARE

## 2021-09-08 ENCOUNTER — HOSPITAL ENCOUNTER (OUTPATIENT)
Dept: INFUSION THERAPY | Age: 71
Discharge: HOME | End: 2021-09-08
Payer: MEDICARE

## 2021-09-08 VITALS
HEIGHT: 62 IN | DIASTOLIC BLOOD PRESSURE: 71 MMHG | OXYGEN SATURATION: 96 % | SYSTOLIC BLOOD PRESSURE: 142 MMHG | BODY MASS INDEX: 26.72 KG/M2 | RESPIRATION RATE: 18 BRPM | HEART RATE: 96 BPM | WEIGHT: 145.2 LBS | TEMPERATURE: 98.6 F

## 2021-09-08 DIAGNOSIS — G47.00 INSOMNIA, UNSPECIFIED TYPE: ICD-10-CM

## 2021-09-08 DIAGNOSIS — Z51.81 ENCOUNTER FOR MONITORING AROMATASE INHIBITOR THERAPY: Primary | ICD-10-CM

## 2021-09-08 DIAGNOSIS — Z17.0 MALIGNANT NEOPLASM OF OVERLAPPING SITES OF RIGHT BREAST IN FEMALE, ESTROGEN RECEPTOR POSITIVE (HCC): ICD-10-CM

## 2021-09-08 DIAGNOSIS — C50.811 MALIGNANT NEOPLASM OF OVERLAPPING SITES OF RIGHT BREAST IN FEMALE, ESTROGEN RECEPTOR POSITIVE (HCC): ICD-10-CM

## 2021-09-08 DIAGNOSIS — Z79.811 ENCOUNTER FOR MONITORING AROMATASE INHIBITOR THERAPY: Primary | ICD-10-CM

## 2021-09-08 DIAGNOSIS — R23.2 HOT FLASHES: ICD-10-CM

## 2021-09-08 LAB
ABSOLUTE IMMATURE GRANULOCYTE: 0.01 THOU/MM3 (ref 0–0.07)
BASINOPHIL, AUTOMATED: 0 % (ref 0–3)
BASOPHILS ABSOLUTE: 0 THOU/MM3 (ref 0–0.1)
BUN, WHOLE BLOOD: 15 MG/DL (ref 8–26)
CHLORIDE, WHOLE BLOOD: 101 MEQ/L (ref 98–109)
CREATININE, WHOLE BLOOD: 0.8 MG/DL (ref 0.5–1.2)
EOSINOPHILS ABSOLUTE: 0.2 THOU/MM3 (ref 0–0.4)
EOSINOPHILS RELATIVE PERCENT: 3 % (ref 0–4)
GFR, ESTIMATED ,CON: 75 ML/MIN/1.73M2
GLUCOSE, WHOLE BLOOD: 84 MG/DL (ref 70–108)
HCT VFR BLD CALC: 40.5 % (ref 37–47)
HEMOGLOBIN: 14.1 GM/DL (ref 12–16)
IMMATURE GRANULOCYTES: 0 %
IONIZED CALCIUM, WHOLE BLOOD: 1.22 MMOL/L (ref 1.12–1.32)
LYMPHOCYTES # BLD: 27 % (ref 15–47)
LYMPHOCYTES ABSOLUTE: 1.9 THOU/MM3 (ref 1–4.8)
MCH RBC QN AUTO: 29.6 PG (ref 26–33)
MCHC RBC AUTO-ENTMCNC: 34.8 GM/DL (ref 32.2–35.5)
MCV RBC AUTO: 85 FL (ref 81–99)
MONOCYTES ABSOLUTE: 0.5 THOU/MM3 (ref 0.4–1.3)
MONOCYTES: 7 % (ref 0–12)
PDW BLD-RTO: 12.4 % (ref 11.5–14.5)
PLATELET # BLD: 244 THOU/MM3 (ref 130–400)
PMV BLD AUTO: 10 FL (ref 9.4–12.4)
POTASSIUM, WHOLE BLOOD: 3.7 MEQ/L (ref 3.5–4.9)
RBC # BLD: 4.76 MILL/MM3 (ref 4.2–5.4)
SEG NEUTROPHILS: 63 % (ref 43–75)
SEGMENTED NEUTROPHILS ABSOLUTE COUNT: 4.3 THOU/MM3 (ref 1.8–7.7)
SODIUM, WHOLE BLOOD: 139 MEQ/L (ref 138–146)
TOTAL CO2, WHOLE BLOOD: 30 MEQ/L (ref 23–33)
WBC # BLD: 6.9 THOU/MM3 (ref 4.8–10.8)

## 2021-09-08 PROCEDURE — 99214 OFFICE O/P EST MOD 30 MIN: CPT | Performed by: INTERNAL MEDICINE

## 2021-09-08 PROCEDURE — 80076 HEPATIC FUNCTION PANEL: CPT

## 2021-09-08 PROCEDURE — 83735 ASSAY OF MAGNESIUM: CPT

## 2021-09-08 PROCEDURE — 80047 BASIC METABLC PNL IONIZED CA: CPT

## 2021-09-08 PROCEDURE — 99211 OFF/OP EST MAY X REQ PHY/QHP: CPT

## 2021-09-08 PROCEDURE — 85025 COMPLETE CBC W/AUTO DIFF WBC: CPT

## 2021-09-08 PROCEDURE — 82310 ASSAY OF CALCIUM: CPT

## 2021-09-08 PROCEDURE — 36415 COLL VENOUS BLD VENIPUNCTURE: CPT

## 2021-09-08 RX ORDER — TRIAMCINOLONE ACETONIDE 1 MG/G
CREAM TOPICAL
COMMUNITY
Start: 2021-08-10

## 2021-09-09 LAB
ALBUMIN SERPL-MCNC: 4.8 G/DL (ref 3.5–5.1)
ALP BLD-CCNC: 98 U/L (ref 38–126)
ALT SERPL-CCNC: 22 U/L (ref 11–66)
AST SERPL-CCNC: 22 U/L (ref 5–40)
BILIRUB SERPL-MCNC: 0.9 MG/DL (ref 0.3–1.2)
BILIRUBIN DIRECT: < 0.2 MG/DL (ref 0–0.3)
CALCIUM SERPL-MCNC: 10.4 MG/DL (ref 8.5–10.5)
MAGNESIUM: 2.2 MG/DL (ref 1.6–2.4)
TOTAL PROTEIN: 7.5 G/DL (ref 6.1–8)

## 2021-09-09 NOTE — PROGRESS NOTES
[facility] CANCER CENTER  CANCER NETWORK OF [facility]  [facility] [address] [facility]  [address], [facility] [address]  [address]  Dept: [phone]  Dept Fax: [phone]  Loc: [phone]     Subjective:      Chief Complaint:  Cornelio Orta is a 70 y.o. with breast cancer. She initially presented in September 2019 for a routine screening mammogram.  She was noted to have an architectural distortion located in the superior outer aspect of the right breast.  At the time of her mammogram the patient had no associated signs or symptoms of breast disease. She denied feeling any lumps in her breast, having tender breast or any type of discharge from her nipple. The patient was in good health. This led to a ultrasound being completed which confirmed a 1.2 cm abnormality treated in the superior aspect of the breast right breast.  There was a second hypoechoic lobulated mass located beside this mass that measured 2.0 cm in size. In context of this condition an ultrasound-guided right breast biopsy was completed on October 11, 2019. Surgical pathology confirmed an intraductal papilloma with associated microcalcifications as well as ductal cell hyperplasia with columnar cell changes. A second biopsy was completed on October 15, 2019. 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 There is also a atypical ductal hyperplasia. A modifying factor affecting this condition is that on November 5, 2019 the patient underwent a right lumpectomy with sentinel lymph node biopsy by Dr. Liz Ragland at [facility].  Surgical pathology confirmed an invasive papillary carcinoma, grade 1, nuclear grade 1, measuring 4 cm with negative surgical margins. 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 A total of 5 sentinel lymph nodes were noted to be negative for malignancy.   In context to this condition, the patient's estrogen receptor was noted to be positive at 100%, progesterone receptors were positive at 85%, and HER-2/agusto overexpression was negative. Ki-67 was low at 5%. Although I do not have the reports the patient states that she did have genetic testing completed as she has of [address] descent. The patient reports to me that the BRCA gene analysis was negative. Another modifying factor affecting this condition is that Oncotype DX testing was completed. The patient's recurrence score was noted to be 9. Therefore she did not receive a recommendation of chemotherapy. However the patient did receive a recommendation of radiation therapy and this was recently completed at [facility].     HPI:      The patient is here today for follow examination. She is here for follow up of her history of breast cancer. The patient is currently on therapy with Arimidex. She tolerates this well and without side effects. Her overall health has been good. She has no signs or symptoms that are suggestive of recurrence of her breast cancer. The patient denies skeletal pain. Her chief complaint on her review of systems continues to be insomnia and hot flashes/night sweats. We did discuss using vitamin E at a dose of 400 international units twice a day and attempt to try to control her hot flashes. She has not had fever or other signs of infection. The patient denies shortness of breath, chest pain, a change in bowel habits or a change in bladder habits. ECOG performance status is level 0. Her most recent mammogram was on 08/27/2021. This was reported as a benign appearing mammogram.  The results of the mammogram reviewed with the patient today. PMH, SH, and FH:  I reviewed the patients medication list and allergy list as noted on the electronic medical record. The PMH, SH and FH were also reviewed as noted on the EMR. Review of Systems  Constitutional: Insomnia, hot flashes.     HENT: [address] [facility], [address] [place] [address], [address] of the [facility]      **This report has been created using voice recognition software. It may contain minor errors which are inherent in voice recognition technology. **

## 2021-10-05 DIAGNOSIS — M81.0 SENILE OSTEOPOROSIS: ICD-10-CM

## 2021-10-05 RX ORDER — ZOLEDRONIC ACID 5 MG/100ML
5 INJECTION, SOLUTION INTRAVENOUS ONCE
Status: CANCELLED | OUTPATIENT
Start: 2021-10-11 | End: 2021-10-11

## 2021-10-06 ENCOUNTER — HOSPITAL ENCOUNTER (OUTPATIENT)
Dept: NURSING | Age: 71
End: 2021-10-06
Payer: MEDICARE

## 2021-10-28 ENCOUNTER — HOSPITAL ENCOUNTER (OUTPATIENT)
Dept: NURSING | Age: 71
Discharge: HOME | End: 2021-10-28
Payer: MEDICARE

## 2021-10-28 VITALS
TEMPERATURE: 97.6 F | DIASTOLIC BLOOD PRESSURE: 70 MMHG | OXYGEN SATURATION: 99 % | RESPIRATION RATE: 16 BRPM | SYSTOLIC BLOOD PRESSURE: 126 MMHG | BODY MASS INDEX: 25.61 KG/M2 | HEART RATE: 74 BPM | WEIGHT: 140 LBS

## 2021-10-28 DIAGNOSIS — M81.0 SENILE OSTEOPOROSIS: Primary | ICD-10-CM

## 2021-10-28 PROCEDURE — 6360000002 HC RX W HCPCS: Performed by: OBSTETRICS & GYNECOLOGY

## 2021-10-28 PROCEDURE — 96365 THER/PROPH/DIAG IV INF INIT: CPT

## 2021-10-28 RX ORDER — ZOLEDRONIC ACID 5 MG/100ML
5 INJECTION, SOLUTION INTRAVENOUS ONCE
Status: CANCELLED | OUTPATIENT
Start: 2021-10-28 | End: 2021-10-28

## 2021-10-28 RX ORDER — ZOLEDRONIC ACID 5 MG/100ML
5 INJECTION, SOLUTION INTRAVENOUS ONCE
Status: COMPLETED | OUTPATIENT
Start: 2021-10-28 | End: 2021-10-28

## 2021-10-28 RX ADMIN — ZOLEDRONIC ACID 5 MG: 5 INJECTION, SOLUTION INTRAVENOUS at 13:17

## 2021-10-28 ASSESSMENT — PAIN - FUNCTIONAL ASSESSMENT: PAIN_FUNCTIONAL_ASSESSMENT: 0-10

## 2021-10-28 NOTE — PROGRESS NOTES
1305 pt admitted to \[facility]\"" per ambulation for a reclast infusion. Pt has met the pre-criteria for a reclast infusion . PT RIGHTS AND RESPONSIBILITIES OFFERED TO PT.  1340 infusion completed. pt cris well. Stable. Discharge instructions given to patient. Verbalize understanding.     1345 discharge per ambulation to home.                     __m__ Safety:       (Environmental)  Viv [place] to environment   Ensure ID band is correct and in place/ allergy band as needed   Assess for fall risk   Initiate fall precautions as applicable (fall band, side rails, etc.)   Call light within reach   Bed in low position/ wheels locked    ___m_ Pain:        Assess pain level and characteristics   Administer analgesics as ordered   Assess effectiveness of pain management and report to MD as needed    __m__ Knowledge Deficit:   Assess baseline knowledge   Provide teaching at level of understanding   Provide teaching via preferred learning method   Evaluate teaching effectiveness  m___ Hemodynamic/Respiratory Status:       (Pre and Post Procedure Monitoring)   Assess/Monitor vital signs and LOC   Assess Baseline SpO2 prior to any sedation   Obtain weight/height   Assess vital signs/ LOC until patient meets discharge criteria   Monitor procedure site and notify MD of any issues

## 2022-03-08 ENCOUNTER — OFFICE VISIT (OUTPATIENT)
Dept: ONCOLOGY | Age: 72
End: 2022-03-08
Payer: MEDICARE

## 2022-03-08 ENCOUNTER — HOSPITAL ENCOUNTER (OUTPATIENT)
Dept: INFUSION THERAPY | Age: 72
Discharge: HOME | End: 2022-03-08
Payer: MEDICARE

## 2022-03-08 VITALS
TEMPERATURE: 98.6 F | OXYGEN SATURATION: 99 % | SYSTOLIC BLOOD PRESSURE: 148 MMHG | WEIGHT: 147 LBS | DIASTOLIC BLOOD PRESSURE: 69 MMHG | HEIGHT: 62 IN | RESPIRATION RATE: 18 BRPM | HEART RATE: 79 BPM | BODY MASS INDEX: 27.05 KG/M2

## 2022-03-08 VITALS
SYSTOLIC BLOOD PRESSURE: 148 MMHG | WEIGHT: 147 LBS | TEMPERATURE: 98.6 F | BODY MASS INDEX: 27.05 KG/M2 | HEIGHT: 62 IN | HEART RATE: 79 BPM | DIASTOLIC BLOOD PRESSURE: 69 MMHG | RESPIRATION RATE: 18 BRPM

## 2022-03-08 DIAGNOSIS — Z51.81 ENCOUNTER FOR MONITORING AROMATASE INHIBITOR THERAPY: ICD-10-CM

## 2022-03-08 DIAGNOSIS — C50.811 MALIGNANT NEOPLASM OF OVERLAPPING SITES OF RIGHT BREAST IN FEMALE, ESTROGEN RECEPTOR POSITIVE (HCC): Primary | ICD-10-CM

## 2022-03-08 DIAGNOSIS — Z79.811 ENCOUNTER FOR MONITORING AROMATASE INHIBITOR THERAPY: ICD-10-CM

## 2022-03-08 DIAGNOSIS — Z17.0 MALIGNANT NEOPLASM OF OVERLAPPING SITES OF RIGHT BREAST IN FEMALE, ESTROGEN RECEPTOR POSITIVE (HCC): ICD-10-CM

## 2022-03-08 DIAGNOSIS — G47.00 INSOMNIA, UNSPECIFIED TYPE: ICD-10-CM

## 2022-03-08 DIAGNOSIS — Z17.0 MALIGNANT NEOPLASM OF OVERLAPPING SITES OF RIGHT BREAST IN FEMALE, ESTROGEN RECEPTOR POSITIVE (HCC): Primary | ICD-10-CM

## 2022-03-08 DIAGNOSIS — C50.811 MALIGNANT NEOPLASM OF OVERLAPPING SITES OF RIGHT BREAST IN FEMALE, ESTROGEN RECEPTOR POSITIVE (HCC): ICD-10-CM

## 2022-03-08 LAB
ABSOLUTE IMMATURE GRANULOCYTE: 0.01 THOU/MM3 (ref 0–0.07)
ALBUMIN SERPL-MCNC: 4.7 G/DL (ref 3.5–5.1)
ALP BLD-CCNC: 88 U/L (ref 38–126)
ALT SERPL-CCNC: 21 U/L (ref 11–66)
AST SERPL-CCNC: 27 U/L (ref 5–40)
BASINOPHIL, AUTOMATED: 0 % (ref 0–3)
BASOPHILS ABSOLUTE: 0 THOU/MM3 (ref 0–0.1)
BILIRUB SERPL-MCNC: 0.9 MG/DL (ref 0.3–1.2)
BILIRUBIN DIRECT: < 0.2 MG/DL (ref 0–0.3)
BUN, WHOLE BLOOD: 17 MG/DL (ref 8–26)
CHLORIDE, WHOLE BLOOD: 100 MEQ/L (ref 98–109)
CREATININE, WHOLE BLOOD: 0.8 MG/DL (ref 0.5–1.2)
EOSINOPHILS ABSOLUTE: 0.2 THOU/MM3 (ref 0–0.4)
EOSINOPHILS RELATIVE PERCENT: 3 % (ref 0–4)
GFR, ESTIMATED ,CON: 75 ML/MIN/1.73M2
GLUCOSE, WHOLE BLOOD: 83 MG/DL (ref 70–108)
HCT VFR BLD CALC: 42.3 % (ref 37–47)
HEMOGLOBIN: 14.3 GM/DL (ref 12–16)
IMMATURE GRANULOCYTES: 0 %
IONIZED CALCIUM, WHOLE BLOOD: 1.28 MMOL/L (ref 1.12–1.32)
LYMPHOCYTES # BLD: 20 % (ref 15–47)
LYMPHOCYTES ABSOLUTE: 1.5 THOU/MM3 (ref 1–4.8)
MCH RBC QN AUTO: 28.4 PG (ref 26–33)
MCHC RBC AUTO-ENTMCNC: 33.8 GM/DL (ref 32.2–35.5)
MCV RBC AUTO: 84 FL (ref 81–99)
MONOCYTES ABSOLUTE: 0.5 THOU/MM3 (ref 0.4–1.3)
MONOCYTES: 7 % (ref 0–12)
PDW BLD-RTO: 12.4 % (ref 11.5–14.5)
PLATELET # BLD: 251 THOU/MM3 (ref 130–400)
PMV BLD AUTO: 9.8 FL (ref 9.4–12.4)
POTASSIUM, WHOLE BLOOD: 3.5 MEQ/L (ref 3.5–4.9)
RBC # BLD: 5.04 MILL/MM3 (ref 4.2–5.4)
SEG NEUTROPHILS: 70 % (ref 43–75)
SEGMENTED NEUTROPHILS ABSOLUTE COUNT: 5.4 THOU/MM3 (ref 1.8–7.7)
SODIUM, WHOLE BLOOD: 138 MEQ/L (ref 138–146)
TOTAL CO2, WHOLE BLOOD: 32 MEQ/L (ref 23–33)
TOTAL PROTEIN: 8 G/DL (ref 6.1–8)
WBC # BLD: 7.6 THOU/MM3 (ref 4.8–10.8)

## 2022-03-08 PROCEDURE — 80076 HEPATIC FUNCTION PANEL: CPT

## 2022-03-08 PROCEDURE — 85025 COMPLETE CBC W/AUTO DIFF WBC: CPT

## 2022-03-08 PROCEDURE — 99211 OFF/OP EST MAY X REQ PHY/QHP: CPT

## 2022-03-08 PROCEDURE — 99214 OFFICE O/P EST MOD 30 MIN: CPT | Performed by: INTERNAL MEDICINE

## 2022-03-08 PROCEDURE — 80047 BASIC METABLC PNL IONIZED CA: CPT

## 2022-03-08 RX ORDER — MULTIVIT WITH MINERALS/LUTEIN
1000 TABLET ORAL DAILY
COMMUNITY

## 2022-03-08 NOTE — PROGRESS NOTES
[facility] CENTER  CANCER NETWORK OF [facility]  [facility] [address] [facility]  [address], [facility] [address]  [address]  Dept: [phone]  Dept Fax: [phone]  Loc: [phone]     Subjective:      Chief Complaint:  Babatunde Pablo is a 67 y.o. with breast cancer. She initially presented in September 2019 for a routine screening mammogram.  She was noted to have an architectural distortion located in the superior outer aspect of the right breast.  At the time of her mammogram the patient had no associated signs or symptoms of breast disease. She denied feeling any lumps in her breast, having tender breast or any type of discharge from her nipple. The patient was in good health. This led to a ultrasound being completed which confirmed a 1.2 cm abnormality treated in the superior aspect of the breast right breast.  There was a second hypoechoic lobulated mass located beside this mass that measured 2.0 cm in size. In context of this condition an ultrasound-guided right breast biopsy was completed on October 11, 2019. Surgical pathology confirmed an intraductal papilloma with associated microcalcifications as well as ductal cell hyperplasia with columnar cell changes. A second biopsy was completed on October 15, 2019. 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 There is also a atypical ductal hyperplasia. A modifying factor affecting this condition is that on November 5, 2019 the patient underwent a right lumpectomy with sentinel lymph node biopsy by Dr. Carmel Crowe at [facility].  Surgical pathology confirmed an invasive papillary carcinoma, grade 1, nuclear grade 1, measuring 4 cm with negative surgical margins. 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 A total of 5 sentinel lymph nodes were noted to be negative for malignancy.   In context to this condition, the patient's estrogen receptor was noted to be positive at 100%, progesterone receptors were positive at 85%, and HER-2/agusto overexpression was negative. Ki-67 was low at 5%. Although I do not have the reports the patient states that she did have genetic testing completed as she has of [address] descent. The patient reports to me that the BRCA gene analysis was negative. Another modifying factor affecting this condition is that Oncotype DX testing was completed. The patient's recurrence score was noted to be 9. Therefore she did not receive a recommendation of chemotherapy. However the patient did receive a recommendation of radiation therapy and this was recently completed at [facility].     HPI:      Ramón West is here today for follow-up regarding her history of breast cancer. She continues on therapy with Arimidex that she tolerates fairly well. The patient has experienced hot flashes related to the use of Arimidex. She states that this symptom has improved with the use of vitamin E.  Her chief complaint on today's evaluation is insomnia. The patient has tried multiple over-the-counter therapies without success. She states that Benadryl causes a hangover effect the next morning and therefore does not tolerate this well. We will discussed using low-dose melatonin and she does want to try this. The patient is reluctant to use pharmaceutical prescription medications for her insomnia. She does not have any specific signs or symptoms to suggest recurrence of her malignancy. Cherrie Merida has not had fever, cough, shortness of breath or other signs of infection. The patient's bowel and bladder habits have been normal.  She has not seen blood in her stool or urine. The patient remains active with an ECOG performance status of level 0. Her most recent mammogram on February 10, 2022. This was reported as a benign appearing mammogram.  The result was reviewed with the patient today. The patient's blood pressure is modestly elevated. She will continue to monitor her blood pressure and follow-up with her primary care provider for further management. PMH, SH, and FH:  I reviewed the patients medication list and allergy list as noted on the electronic medical record. The PMH, SH and FH were also reviewed as noted on the EMR. Review of Systems  Constitutional: Insomnia. HENT: Negative. Eyes: Negative. Respiratory: Negative. Cardiovascular: Negative. Gastrointestinal: Negative. Genitourinary: Negative. Musculoskeletal: Negative. Skin: Negative. Neurological: Negative. Hematological: Negative. Psychiatric/Behavioral: Negative. Objective:   Physical Exam  Vitals:    03/08/22 1428   BP: (!) 148/69   Pulse: 79   Resp: 18   Temp: 98.6 °F (37 °C)   SpO2: 99%   Vitals reviewed and are stable. Constitutional: Well-developed. No acute distress. HENT: Normocephalic and atraumatic. Eyes: Pupils appear equal and reactive. Neck: Overall appearance is symmetrical. No identifiable masses. Pulmonary: Effort normal. No respiratory distress. .  Neurological: Alert and oriented to person, place, and time. Judgment and thought content normal.  Skin: Skin is warm and dry. No rash. Psychiatric: Mood and affect appropriate for the clinical situation. Data Analysis: The following studies were reviewed with the patient today:    Hematology 3/8/2022 9/8/2021 3/9/2021   WBC 7.6 6.9 7.8   RBC 5.04 4.76 4.67   HGB 14.3 14.1 13.6   HCT 42.3 40.5 40.4   MCV 84 85 87   RDW 12.4 12.4 13.1    244 255     Assessment:   1. 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  2.  Therapy with aromatase inhibitor - Arimidex. 3.  Insomnia. Plan:   1. Continue a Arimidex 1 mg tablet by mouth daily. 2.  Monitor for recurrence of malignancy. 3.  Monitor for side effects and toxicity related to Arimidex. 4. Low-dose melatonin for therapy for insomnia.   5.  Continue annual mammogram.    Tayo Rehman M.D. Medical Director: [facility]  Cancer Network [place] [address], [address] [place] [address], [address] [place] [address], [address] [place] [address] of the [facility] at [facility]      **This report has been created using voice recognition software. It may contain minor errors which are inherent in voice recognition technology. **

## 2022-04-18 DIAGNOSIS — Z17.0 MALIGNANT NEOPLASM OF OVERLAPPING SITES OF RIGHT BREAST IN FEMALE, ESTROGEN RECEPTOR POSITIVE (HCC): ICD-10-CM

## 2022-04-18 DIAGNOSIS — C50.811 MALIGNANT NEOPLASM OF OVERLAPPING SITES OF RIGHT BREAST IN FEMALE, ESTROGEN RECEPTOR POSITIVE (HCC): ICD-10-CM

## 2022-04-18 RX ORDER — ANASTROZOLE 1 MG/1
TABLET ORAL
Qty: 90 TABLET | Refills: 3 | Status: SHIPPED | OUTPATIENT
Start: 2022-04-18 | End: 2022-09-08 | Stop reason: SDUPTHER

## 2022-09-07 ENCOUNTER — HOSPITAL ENCOUNTER (OUTPATIENT)
Dept: INFUSION THERAPY | Age: 72
Discharge: HOME | End: 2022-09-07
Payer: MEDICARE

## 2022-09-07 ENCOUNTER — OFFICE VISIT (OUTPATIENT)
Dept: ONCOLOGY | Age: 72
End: 2022-09-07
Payer: MEDICARE

## 2022-09-07 VITALS
RESPIRATION RATE: 20 BRPM | WEIGHT: 147.4 LBS | OXYGEN SATURATION: 97 % | DIASTOLIC BLOOD PRESSURE: 84 MMHG | TEMPERATURE: 98.6 F | BODY MASS INDEX: 27.12 KG/M2 | SYSTOLIC BLOOD PRESSURE: 157 MMHG | HEIGHT: 62 IN | HEART RATE: 98 BPM

## 2022-09-07 VITALS
WEIGHT: 147.4 LBS | HEART RATE: 98 BPM | TEMPERATURE: 98.6 F | DIASTOLIC BLOOD PRESSURE: 84 MMHG | HEIGHT: 62 IN | BODY MASS INDEX: 27.12 KG/M2 | RESPIRATION RATE: 20 BRPM | SYSTOLIC BLOOD PRESSURE: 157 MMHG | OXYGEN SATURATION: 97 %

## 2022-09-07 DIAGNOSIS — Z12.31 ENCOUNTER FOR SCREENING MAMMOGRAM FOR MALIGNANT NEOPLASM OF BREAST: ICD-10-CM

## 2022-09-07 DIAGNOSIS — C50.919 MALIGNANT NEOPLASM OF BREAST IN FEMALE, ESTROGEN RECEPTOR POSITIVE, UNSPECIFIED LATERALITY, UNSPECIFIED SITE OF BREAST (HCC): Primary | ICD-10-CM

## 2022-09-07 DIAGNOSIS — Z17.0 MALIGNANT NEOPLASM OF BREAST IN FEMALE, ESTROGEN RECEPTOR POSITIVE, UNSPECIFIED LATERALITY, UNSPECIFIED SITE OF BREAST (HCC): Primary | ICD-10-CM

## 2022-09-07 DIAGNOSIS — Z17.0 MALIGNANT NEOPLASM OF OVERLAPPING SITES OF RIGHT BREAST IN FEMALE, ESTROGEN RECEPTOR POSITIVE (HCC): ICD-10-CM

## 2022-09-07 DIAGNOSIS — C50.811 MALIGNANT NEOPLASM OF OVERLAPPING SITES OF RIGHT BREAST IN FEMALE, ESTROGEN RECEPTOR POSITIVE (HCC): ICD-10-CM

## 2022-09-07 PROCEDURE — 99214 OFFICE O/P EST MOD 30 MIN: CPT | Performed by: INTERNAL MEDICINE

## 2022-09-07 PROCEDURE — 99211 OFF/OP EST MAY X REQ PHY/QHP: CPT

## 2022-09-07 PROCEDURE — 1123F ACP DISCUSS/DSCN MKR DOCD: CPT | Performed by: INTERNAL MEDICINE

## 2022-09-07 NOTE — PROGRESS NOTES
[facility] CANCER CENTER  CANCER NETWORK OF [facility]  [facility] [address] [place] [address] [facility] PROFESSIONAL SERVICES  [address] [place] [address]  Dept: [phone]  Dept Fax: [phone]: [phone]     Encounter Date: 9/7/2022    Referring Physician:  No ref. provider found     Primary Provider: Doroteo Saavedra MD     HPI:  Hilda Lambert is a very pleasant 67 y.o. female ([address] descent) seen in continued follow-up of breast cancer. She initially presented with abnormal mammography September 2019. This led further imaging confirming 1.2 cm abnormality in the superior aspect of right breast. There was a second hypoechoic lobulated mass located beside this mass that measured 2.0 cm in size. In context of this condition an ultrasound-guided right breast biopsy was completed on October 11, 2019. Surgical pathology confirmed an intraductal papilloma with associated microcalcifications as well as ductal cell hyperplasia with columnar cell changes. A second biopsy was completed on October 15, 2019. 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 There is also a atypical ductal hyperplasia. Patient underwent right lumpectomy/SLN 11/5/2019  by Dr. Seble Nolen at [facility]. Surgical pathology confirmed an invasive papillary carcinoma, grade 1, nuclear grade 1, measuring 4 cm with negative surgical margins. 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 A total of 5 sentinel lymph nodes were noted to be negative for malignancy. % DE 85% HER2 negative Ki-67 5%. Oncotype DX score 9. She did not receive chemotherapy. She did receive radiation at [facility]. per records BRCA gene analysis performed at outside facility was negative    Patient is here for continued follow-up. She reports problems with insomnia and chronic history of hot flashes.   She has been taking vitamin D supplements without benefit. No debilitating pains. Currently on Arimidex    Review of Systems  Constitutional: Negative. HENT: Negative. Eyes: Negative. Respiratory: Negative. Cardiovascular: Negative. Gastrointestinal: Negative. Genitourinary: Negative. Musculoskeletal: Negative. Skin: Negative. Neurological: Negative. Hematological: Negative. Psychiatric/Behavioral: Negative. Past Medical History   has a past medical history of Hyperlipidemia, Hypertension, and Trigger finger. Surgical History   has a past surgical history that includes Endometrial ablation; Tonsillectomy; Dilation and curettage of uterus; Finger trigger release; and Breast lumpectomy (11/05/2019). Home Medications  has a current medication list which includes the following prescription(s): anastrozole, vitamin e, triamcinolone, prasterone (dhea), glucosamine-chondroitin, zoledronic acid, lactobacillus, calcium carbonate, melatonin, tylenol pm extra strength, simvastatin, estradiol, triamterene-hydrochlorothiazide, cetirizine, vitamin d, b complex vitamins, polycarbophil, and turmeric. Allergies  Allergies   Allergen Reactions    Bee Venom Other (See Comments)     Hanging skin    Propoxyphene Other (See Comments)     Hallucinations    Sulfa Antibiotics        Social History   reports that she quit smoking about 41 years ago. Her smoking use included cigarettes. She has never used smokeless tobacco. She reports that she does not drink alcohol and does not use drugs. Family History  family history includes Heart Disease in her father and mother; Thyroid Disease in her sister. Labs: Reviewed    Physical Exam  Vitals:    09/07/22 1429   BP: (!) 157/84   Pulse: 98   Resp: 20   Temp: 98.6 °F (37 °C)   SpO2: 97%      General: Non-ill appearing.   HEENT: NC/AT,nonicteric, perrla,eom intact, no mucosal lesions  Neck: normal thyroid, no masses  Nodes: No adenopathy  Lungs/chest: clear, no rales,rhonchi or wheezing, lung bases clear  CV: rrr, no rubs ,gallops or murmurs  Abdomen: soft, non-tender,bowel sounds normal , no palpable hepatosplenomegaly  Back: normal curvature, No midline tenderness. flanks nontender  : Not Examined  Extremities: no cyanosis,clubbing or edema. Skin: unremarkable  Neuro: A and O x 4, CN exam nonfocal, Motor- no deficits, Sensory- no deficits, gait-nl, speech- fluent, no ataxia. Assessment/Plan:   Invasive papillary carcinoma of the right breast ER/VT positive status post right lumpectomy/SLN. Currently on Arimidex (since 11/2019)  Hot flashes secondary to aromatase inhibitors  Insomnia  Other co morbidities per Epic    Continue Arimidex for total of 5 years  NCCN guidelines in regards to breast cancer surveillance discussed  She will need yearly mammography with breast examinations   Return to clinic 1 year    Sonia Enamorado MD  9/7/2022      **This report has been created using voice recognition software. It may contain minor errors which are inherent in voice recognition technology. **

## 2022-09-08 RX ORDER — ANASTROZOLE 1 MG/1
TABLET ORAL
Qty: 90 TABLET | Refills: 3 | Status: SHIPPED | OUTPATIENT
Start: 2022-09-08

## 2023-02-02 ENCOUNTER — TELEPHONE (OUTPATIENT)
Dept: ONCOLOGY | Age: 73
End: 2023-02-02

## 2024-12-04 ENCOUNTER — LAB (OUTPATIENT)
Dept: LAB | Age: 74
End: 2024-12-04

## 2024-12-04 LAB
CALCIUM SERPL-MCNC: 10.8 MG/DL (ref 8.5–10.5)
CREAT SERPL-MCNC: 0.8 MG/DL (ref 0.4–1.2)
GFR SERPL CREATININE-BSD FRML MDRD: 77 ML/MIN/1.73M2

## 2024-12-06 RX ORDER — SODIUM CHLORIDE 9 MG/ML
5-250 INJECTION, SOLUTION INTRAVENOUS PRN
OUTPATIENT
Start: 2024-12-13

## 2024-12-06 RX ORDER — ZOLEDRONIC ACID 0.05 MG/ML
5 INJECTION, SOLUTION INTRAVENOUS ONCE
OUTPATIENT
Start: 2024-12-13 | End: 2024-12-13

## 2024-12-06 RX ORDER — SODIUM CHLORIDE 0.9 % (FLUSH) 0.9 %
5-40 SYRINGE (ML) INJECTION PRN
OUTPATIENT
Start: 2024-12-13

## 2024-12-20 ENCOUNTER — HOSPITAL ENCOUNTER (OUTPATIENT)
Dept: NURSING | Age: 74
Discharge: HOME | End: 2024-12-20
Payer: MEDICARE

## 2024-12-20 VITALS
WEIGHT: 147 LBS | BODY MASS INDEX: 26.89 KG/M2 | HEART RATE: 78 BPM | RESPIRATION RATE: 18 BRPM | DIASTOLIC BLOOD PRESSURE: 67 MMHG | SYSTOLIC BLOOD PRESSURE: 139 MMHG | TEMPERATURE: 98.3 F | OXYGEN SATURATION: 95 %

## 2024-12-20 DIAGNOSIS — M81.0 SENILE OSTEOPOROSIS: Primary | ICD-10-CM

## 2024-12-20 PROCEDURE — 96365 THER/PROPH/DIAG IV INF INIT: CPT

## 2024-12-20 PROCEDURE — 2500000003 HC RX 250 WO HCPCS: Performed by: OBSTETRICS & GYNECOLOGY

## 2024-12-20 PROCEDURE — 6360000002 HC RX W HCPCS: Performed by: OBSTETRICS & GYNECOLOGY

## 2024-12-20 RX ORDER — SODIUM CHLORIDE 0.9 % (FLUSH) 0.9 %
5-40 SYRINGE (ML) INJECTION PRN
Status: CANCELLED | OUTPATIENT
Start: 2024-12-20

## 2024-12-20 RX ORDER — ZOLEDRONIC ACID 0.05 MG/ML
5 INJECTION, SOLUTION INTRAVENOUS ONCE
Status: CANCELLED | OUTPATIENT
Start: 2024-12-20 | End: 2024-12-20

## 2024-12-20 RX ORDER — SODIUM CHLORIDE 0.9 % (FLUSH) 0.9 %
5-40 SYRINGE (ML) INJECTION PRN
Status: DISCONTINUED | OUTPATIENT
Start: 2024-12-20 | End: 2024-12-21 | Stop reason: HOSPADM

## 2024-12-20 RX ORDER — SODIUM CHLORIDE 9 MG/ML
5-250 INJECTION, SOLUTION INTRAVENOUS PRN
OUTPATIENT
Start: 2024-12-20

## 2024-12-20 RX ORDER — ZOLEDRONIC ACID 0.05 MG/ML
5 INJECTION, SOLUTION INTRAVENOUS ONCE
Status: COMPLETED | OUTPATIENT
Start: 2024-12-20 | End: 2024-12-20

## 2024-12-20 RX ADMIN — SODIUM CHLORIDE, PRESERVATIVE FREE 10 ML: 5 INJECTION INTRAVENOUS at 13:15

## 2024-12-20 RX ADMIN — ZOLEDRONIC ACID 5 MG: 5 INJECTION, SOLUTION INTRAVENOUS at 13:15

## 2024-12-20 ASSESSMENT — PAIN - FUNCTIONAL ASSESSMENT: PAIN_FUNCTIONAL_ASSESSMENT: 0-10

## 2024-12-20 NOTE — PROGRESS NOTES
Patient admitted to room 01, vitals are stable. Patient offered rights and responsibilities.     __m__ Safety:       (Environmental)  [place] to environment  Ensure ID band is correct and in place/ allergy band as needed  Assess for fall risk  Initiate fall precautions as applicable (fall band, side rails, etc.)  Call light within reach  Bed in low position/ wheels locked    __m__ Pain:       Assess pain level and characteristics  Administer analgesics as ordered  Assess effectiveness of pain management and report to MD as needed    _m___ Knowledge Deficit:  Assess baseline knowledge  Provide teaching at level of understanding  Provide teaching via preferred learning method  Evaluate teaching effectiveness    _m___ Hemodynamic/Respiratory Status:       (Pre and Post Procedure Monitoring)  Assess/Monitor vital signs and LOC  Assess Baseline SpO2 prior to any sedation  Obtain weight/height  Assess vital signs/ LOC until patient meets discharge criteria  Monitor procedure site and notify MD of any issues

## 2025-01-31 ENCOUNTER — TRANSCRIBE ORDERS (OUTPATIENT)
Dept: ADMINISTRATIVE | Age: 75
End: 2025-01-31

## 2025-01-31 DIAGNOSIS — R92.2 INCONCLUSIVE MAMMOGRAM: Primary | ICD-10-CM

## 2025-01-31 DIAGNOSIS — R92.30 DENSE BREAST TISSUE ON MAMMOGRAM, UNSPECIFIED TYPE: ICD-10-CM

## 2025-05-22 ENCOUNTER — LAB (OUTPATIENT)
Dept: LAB | Age: 75
End: 2025-05-22

## 2025-05-22 LAB
ALBUMIN SERPL BCG-MCNC: 4.2 G/DL (ref 3.4–4.9)
ALP SERPL-CCNC: 69 U/L (ref 38–126)
ALT SERPL W/O P-5'-P-CCNC: 24 U/L (ref 10–35)
ANION GAP SERPL CALC-SCNC: 10 MEQ/L (ref 8–16)
AST SERPL-CCNC: 27 U/L (ref 10–35)
BASOPHILS ABSOLUTE: 0 THOU/MM3 (ref 0–0.1)
BASOPHILS NFR BLD AUTO: 0.7 %
BILIRUB CONJ SERPL-MCNC: 0.2 MG/DL (ref 0–0.2)
BILIRUB SERPL-MCNC: 0.5 MG/DL (ref 0.3–1.2)
BUN SERPL-MCNC: 17 MG/DL (ref 8–23)
CALCIUM SERPL-MCNC: 9.9 MG/DL (ref 8.8–10.2)
CHLORIDE SERPL-SCNC: 102 MEQ/L (ref 98–111)
CHOLEST SERPL-MCNC: 159 MG/DL (ref 100–199)
CO2 SERPL-SCNC: 27 MEQ/L (ref 22–29)
CREAT SERPL-MCNC: 0.8 MG/DL (ref 0.5–0.9)
DEPRECATED RDW RBC AUTO: 40.3 FL (ref 35–45)
EOSINOPHIL NFR BLD AUTO: 6.3 %
EOSINOPHILS ABSOLUTE: 0.4 THOU/MM3 (ref 0–0.4)
ERYTHROCYTE [DISTWIDTH] IN BLOOD BY AUTOMATED COUNT: 13 % (ref 11.5–14.5)
GFR SERPL CREATININE-BSD FRML MDRD: 77 ML/MIN/1.73M2
GLUCOSE FASTING: 92 MG/DL (ref 74–109)
HCT VFR BLD AUTO: 40.4 % (ref 37–47)
HDLC SERPL-MCNC: 46 MG/DL
HGB BLD-MCNC: 13.7 GM/DL (ref 12–16)
IMM GRANULOCYTES # BLD AUTO: 0.01 THOU/MM3 (ref 0–0.07)
IMM GRANULOCYTES NFR BLD AUTO: 0.2 %
LDLC SERPL CALC-MCNC: 88 MG/DL
LYMPHOCYTES ABSOLUTE: 2 THOU/MM3 (ref 1–4.8)
LYMPHOCYTES NFR BLD AUTO: 33.5 %
MCH RBC QN AUTO: 29 PG (ref 26–33)
MCHC RBC AUTO-ENTMCNC: 33.9 GM/DL (ref 32.2–35.5)
MCV RBC AUTO: 85.4 FL (ref 81–99)
MONOCYTES ABSOLUTE: 0.5 THOU/MM3 (ref 0.4–1.3)
MONOCYTES NFR BLD AUTO: 8.2 %
NEUTROPHILS ABSOLUTE: 3 THOU/MM3 (ref 1.8–7.7)
NEUTROPHILS NFR BLD AUTO: 51.1 %
NRBC BLD AUTO-RTO: 0 /100 WBC
PLATELET # BLD AUTO: 241 THOU/MM3 (ref 130–400)
PMV BLD AUTO: 11.3 FL (ref 9.4–12.4)
POTASSIUM SERPL-SCNC: 3.6 MEQ/L (ref 3.5–5.2)
PROT SERPL-MCNC: 7 G/DL (ref 6.4–8.3)
RBC # BLD AUTO: 4.73 MILL/MM3 (ref 4.2–5.4)
SODIUM SERPL-SCNC: 139 MEQ/L (ref 135–145)
TRIGL SERPL-MCNC: 127 MG/DL (ref 0–199)
WBC # BLD AUTO: 5.9 THOU/MM3 (ref 4.8–10.8)